# Patient Record
Sex: FEMALE | Race: WHITE | Employment: FULL TIME | URBAN - METROPOLITAN AREA
[De-identification: names, ages, dates, MRNs, and addresses within clinical notes are randomized per-mention and may not be internally consistent; named-entity substitution may affect disease eponyms.]

---

## 2018-10-24 ENCOUNTER — HOSPITAL ENCOUNTER (OUTPATIENT)
Dept: GENERAL RADIOLOGY | Age: 38
Discharge: HOME | End: 2018-10-24
Payer: COMMERCIAL

## 2018-10-24 DIAGNOSIS — M51.37 DEGENERATION OF LUMBAR OR LUMBOSACRAL INTERVERTEBRAL DISC: ICD-10-CM

## 2018-10-24 DIAGNOSIS — M47.817 LUMBOSACRAL SPONDYLOSIS WITHOUT MYELOPATHY: ICD-10-CM

## 2018-10-24 DIAGNOSIS — M51.36 DEGENERATION OF LUMBAR INTERVERTEBRAL DISC: ICD-10-CM

## 2018-10-24 DIAGNOSIS — M47.816 LUMBAR SPONDYLOSIS: ICD-10-CM

## 2018-10-24 PROCEDURE — 72110 X-RAY EXAM L-2 SPINE 4/>VWS: CPT

## 2018-11-15 ENCOUNTER — HOSPITAL ENCOUNTER (OUTPATIENT)
Dept: MRI IMAGING | Age: 38
Discharge: HOME | End: 2018-11-15
Payer: COMMERCIAL

## 2018-11-15 DIAGNOSIS — M51.36 DEGENERATION OF LUMBAR INTERVERTEBRAL DISC: ICD-10-CM

## 2018-11-15 DIAGNOSIS — M51.37 DEGENERATION OF LUMBAR OR LUMBOSACRAL INTERVERTEBRAL DISC: ICD-10-CM

## 2018-11-15 PROCEDURE — 72148 MRI LUMBAR SPINE W/O DYE: CPT

## 2019-12-04 ENCOUNTER — HOSPITAL ENCOUNTER (OUTPATIENT)
Dept: PREADMISSION TESTING | Age: 39
Discharge: HOME | End: 2019-12-04
Payer: COMMERCIAL

## 2019-12-04 VITALS
HEIGHT: 67 IN | HEART RATE: 69 BPM | RESPIRATION RATE: 14 BRPM | BODY MASS INDEX: 26.68 KG/M2 | DIASTOLIC BLOOD PRESSURE: 77 MMHG | WEIGHT: 170 LBS | TEMPERATURE: 98.3 F | SYSTOLIC BLOOD PRESSURE: 136 MMHG

## 2019-12-04 LAB
BASOPHILS # BLD: 0.1 K/UL (ref 0–0.1)
BASOPHILS NFR BLD: 1 % (ref 0–1)
DIFFERENTIAL METHOD BLD: NORMAL
EOSINOPHIL # BLD: 0.1 K/UL (ref 0–0.4)
EOSINOPHIL NFR BLD: 1 % (ref 0–7)
ERYTHROCYTE [DISTWIDTH] IN BLOOD BY AUTOMATED COUNT: 12.8 % (ref 11.5–14.5)
HCG UR QL: NEGATIVE
HCT VFR BLD AUTO: 43.4 % (ref 35–47)
HGB BLD-MCNC: 13.9 G/DL (ref 11.5–16)
IMM GRANULOCYTES # BLD AUTO: 0 K/UL (ref 0–0.04)
IMM GRANULOCYTES NFR BLD AUTO: 0 % (ref 0–0.5)
LYMPHOCYTES # BLD: 2.5 K/UL (ref 0.8–3.5)
LYMPHOCYTES NFR BLD: 26 % (ref 12–49)
MCH RBC QN AUTO: 31.1 PG (ref 26–34)
MCHC RBC AUTO-ENTMCNC: 32 G/DL (ref 30–36.5)
MCV RBC AUTO: 97.1 FL (ref 80–99)
MONOCYTES # BLD: 0.8 K/UL (ref 0–1)
MONOCYTES NFR BLD: 8 % (ref 5–13)
NEUTS SEG # BLD: 6.2 K/UL (ref 1.8–8)
NEUTS SEG NFR BLD: 64 % (ref 32–75)
NRBC # BLD: 0 K/UL (ref 0–0.01)
NRBC BLD-RTO: 0 PER 100 WBC
PLATELET # BLD AUTO: 284 K/UL (ref 150–400)
PMV BLD AUTO: 10.4 FL (ref 8.9–12.9)
RBC # BLD AUTO: 4.47 M/UL (ref 3.8–5.2)
WBC # BLD AUTO: 9.7 K/UL (ref 3.6–11)

## 2019-12-04 PROCEDURE — 81025 URINE PREGNANCY TEST: CPT

## 2019-12-04 PROCEDURE — 85025 COMPLETE CBC W/AUTO DIFF WBC: CPT

## 2019-12-04 PROCEDURE — 36415 COLL VENOUS BLD VENIPUNCTURE: CPT

## 2019-12-04 RX ORDER — IBUPROFEN 200 MG
400 TABLET ORAL
COMMUNITY
End: 2019-12-12

## 2019-12-04 RX ORDER — ALPRAZOLAM 0.25 MG/1
0.25 TABLET ORAL AS NEEDED
COMMUNITY

## 2019-12-04 RX ORDER — MAGNESIUM 200 MG
1250 TABLET ORAL DAILY
COMMUNITY

## 2019-12-04 RX ORDER — BISMUTH SUBSALICYLATE 262 MG
0.5 TABLET,CHEWABLE ORAL DAILY
COMMUNITY

## 2019-12-04 RX ORDER — DEXTROMETHORPHAN HYDROBROMIDE, GUAIFENESIN 5; 100 MG/5ML; MG/5ML
650 LIQUID ORAL EVERY 8 HOURS
COMMUNITY
End: 2019-12-12

## 2019-12-04 RX ORDER — CHOLECALCIFEROL (VITAMIN D3) 125 MCG
1 CAPSULE ORAL DAILY
COMMUNITY

## 2019-12-10 ENCOUNTER — ANESTHESIA EVENT (OUTPATIENT)
Dept: SURGERY | Age: 39
End: 2019-12-10
Payer: COMMERCIAL

## 2019-12-10 NOTE — H&P
Chief Complaint  Annual Well Woman 36 - 45 (Established Patient)    annual, pre-op  Patient's Care Team  Primary Care Provider: ZARA FOX PA: [address], [place], [address] [place] [address], Ph [phone], Fax [phone] NPI: [id number]  OB/GYN: Karlene Baker MD: [place], [place], [address] [place] [address], Ph [phone], Fax [phone] NPI: [id number]  Patient's Pharmacies  Krystin Salvador [phone] [facility]): [address] [place] [address], [address] [place] [address], Ph [phone], Fax [phone]  Ht: 5 ft 7 in (170.18 cm) 2019 02:49 pm Wt: 173 lbs (78.47 kg) 2019 02:52 pm BMI: 27.1 2019 02:52 pm   BP: 112/68 sitting R arm 2019 02:53 pm       Allergies  Reviewed Allergies     SHELLFISH DERIVED: Hives    Medications  Reviewed Medications     magnesium 19   entered Merla [place]   Mirena 20 mcg/24 hours (5 yrs) 52 mg intrauterine device  Take by intrauterine route. , start 2018   filled Merla Carol   Vitamin D3 19   entered Merla Carol   Problems  Reviewed Problems     Reduced libido - Onset: 2008 - Entered By: James Goyal MDSigned By: Nadya Silvestre MD Description: DECREASED LIBIDO code: 507.54    19 8am [facility] main/Hysterectomy, Total - Laparoscopic w/Bilateral Salpingectomy/clipp with kimberly to assist  Family History  Discussed Family History    Maternal Grandfather - Heart disease   Father - Heart disease   Mother - Hypertensive disorder   Maternal Uncle - Diverticulitis   Social History  Discussed Social History  OB/GYN Social History  Chewing tobacco: none  Tobacco Smoking Status: Former smoker  Smokeless Tobacco Status: Never used smokeless tobacco  Tobacco-years of use: 10 (Notes: quit )  E-cigarette/Vape Status: Never used electronic cigarettes  Most Recent Tobacco Use Screenin2019  Marital status:  (Notes: moved from [facility])  Are you working: Yes  Occupation: office  On average, how many days per week do you engage in moderate to strenuous EXERCISE (like walking fast, running, jogging, dancing, swimming, biking, or other activities that cause a light or heavy sweat)?: 6  How often do you have a DRINK containing ALCOHOL?: 2-4 times a month  How many standard DRINKS containing alcohol do you have on a typical day?: 1 or 2  Surgical History  Reviewed Surgical History      delivery   tonsilectomy/adenoids  GYN History  Reviewed GYN History  Date of LMP: 2018 (Notes: irregular spotting with IUD). Frequency of Cycle (Q days): 28. Duration of Flow (days): 7. Flow: Heavy. Menses Monthly: Y. Menstrual Cramps: moderate. Date of Last Pap Smear: 10/19/2016 (Notes: RNIL). Date of HPV testing: 10/19/2016. HPV testing results: Negative. Any Treatment for Abnormal Pap?: N. Age at Menarche: 5. HPV Vaccine: N. Sexually Active?: N.  STIs/STDs: Y. Current Birth Control Method: Partner Vasectomy. Date of Last Mammogram: 10/27/2016 (Notes: BIRADS 1). Obstetric History  Reviewed Obstetric History    TOTAL FULL PRE AB. I AB. S ECTOPICS MULTIPLE LIVING   2 2      2   Past Medical History  Discussed Past Medical History  Acne: Y - accutaine  Asthma: Y  HPI  Rm 1: Patient presents today for annual exam and pre op exam.  TLH/bilateral salpingectomy scheduled 19. Irregular bleeding continues. Bleeding is still nearly constant. Mirena placed  with improvement in heaviness but now is persistent and worsens after intercourse. Interested in definitive surgical management. Trial of OCPs in addition was not much help. U/S : 5cm right lateral fibroid. ROS  Additionally reports: Except as noted in the HPI, the review of systems is negative for General, Breast, , Resp, GI, CV, Endo, MS, Psych and Heme. Physical Exam  Patient is a 43-year-old female. Constitutional: General Appearance: well developed and nourished and pleasant. Level of Distress: no acute distress.     Head: Head: normocephalic. Eyes: Extraocular Movements extraocular movements intact. Ears, Nose, Mouth, Throat: Ears normal hearing. Nose: no external nose lesions. Neck: Appearance supple, trachea midline, and no neck masses. Thyroid: no enlargement or nodules and non-tender. Lungs / Chest: Respiratory effort: unlabored. Inspection / Palpation: no deformity, tenderness, or swelling. Cardiovascular: Rate And Rhythm: regular. Extremities: no clubbing, cyanosis, or edema. Breast: Breasts no masses, tenderness, skin changes, abnormal secretions, or axillary lymphadenopathy and symmetric and normal nipple appearance. Abdomen: Inspection and Palpation: no tenderness, guarding, masses, rebound tenderness, or CVA tenderness and soft and non-distended. Liver: non-tender; no masses. Spleen: no masses. Hernia: none palpable. Female : External genitalia: no lesions, rash, or erythema. Vagina: no discharge, mass, or tenderness. Cervix: no discharge or cervical lesion. Uterus: midline, non-tender, no mass, and not enlarged. Adnexae: no adnexal mass or tenderness. Bladder and Urethra: no urethral discharge or mass. Lymphatics: Inguinal no inguinal lymphadenopathy. Skin: General Skin no rash or suspicious lesions. Neurologic: Gait and Station: normal gait. Sensation: grossly intact. Motor: grossly intact. Mental Status Exam: Orientation oriented to person, place, and time. Procedure Documentation  Endometrial Biopsy:    Risks, benefits and indications for endometrial biopsy procedure fully reviewed. Patient questions answered. Informed consent obtained. A time-out was performed to confirm patient identity and procedure. Time:    The patient was placed in dorsal lithotomy position. The speculum placed into vagina. Cervix was prepped with sterile saline. Tenaculum applied to anterior lip of cervix. Endometrial pipelle introduced. Uterus sounded to 9cm. Suction initiated. A total of 1 passes performed. Adequate endometrial tissue obtained. Tenaculum site hemostaticafter application of silver nitrate stick. Patient tolerated procedure well. Assessment / Plan  1. Gynecologic examination -  Discussed current pap guidelines and recommendations - cotesting today. Discussed healthy lifestyle. Discussed need for breast awareness and yearly breast exams by provider and yearly mammograms - will schedule  Z01.419: Encounter for gynecological examination (general) (routine) without abnormal findings   IGP, APTIMA HPV, RFX [id number] -       Specimen source: Endocervical  Gynecological source: Cervical: Y Gynecological source: Endocervical: Y   Collection technique: Brush-Spatula: Y      2. Menorrhagia -  Decision made at the time of the visit to perform EMB, Pt tolerated procedure well and performed without difficulty, reviewed common and concerning post-procedure side effects. scheduled for TLH, BS on 12/11/19  Consent obtained and all questions answered  N92.0: Excessive and frequent menstruation with regular cycle   PATHOLOGY [id number] -       Specimen source: Endometrium    3.  Uterine leiomyoma -  see above  D25.9: Leiomyoma of uterus, unspecified

## 2019-12-11 ENCOUNTER — HOSPITAL ENCOUNTER (OUTPATIENT)
Age: 39
Setting detail: OBSERVATION
Discharge: HOME | End: 2019-12-12
Attending: OBSTETRICS & GYNECOLOGY | Admitting: OBSTETRICS & GYNECOLOGY
Payer: COMMERCIAL

## 2019-12-11 ENCOUNTER — ANESTHESIA (OUTPATIENT)
Dept: SURGERY | Age: 39
End: 2019-12-11
Payer: COMMERCIAL

## 2019-12-11 DIAGNOSIS — G89.18 POST-OP PAIN: Primary | ICD-10-CM

## 2019-12-11 PROBLEM — D21.9 FIBROIDS: Status: ACTIVE | Noted: 2019-12-11

## 2019-12-11 LAB — HCG UR QL: NEGATIVE

## 2019-12-11 PROCEDURE — 74011000250 HC RX REV CODE- 250: Performed by: OBSTETRICS & GYNECOLOGY

## 2019-12-11 PROCEDURE — 77030031139 HC SUT VCRL2 J&J -A: Performed by: OBSTETRICS & GYNECOLOGY

## 2019-12-11 PROCEDURE — 74011250636 HC RX REV CODE- 250/636: Performed by: OBSTETRICS & GYNECOLOGY

## 2019-12-11 PROCEDURE — 74011250636 HC RX REV CODE- 250/636: Performed by: ANESTHESIOLOGY

## 2019-12-11 PROCEDURE — 77030012770 HC TRCR OPT FX AMR -B: Performed by: OBSTETRICS & GYNECOLOGY

## 2019-12-11 PROCEDURE — 77030021668 HC NEB PREFIL KT VYRM -A: Performed by: OBSTETRICS & GYNECOLOGY

## 2019-12-11 PROCEDURE — 76210000017 HC OR PH I REC 1.5 TO 2 HR: Performed by: OBSTETRICS & GYNECOLOGY

## 2019-12-11 PROCEDURE — 77030013079 HC BLNKT BAIR HGGR 3M -A: Performed by: ANESTHESIOLOGY

## 2019-12-11 PROCEDURE — 77030002933 HC SUT MCRYL J&J -A: Performed by: OBSTETRICS & GYNECOLOGY

## 2019-12-11 PROCEDURE — 77030019908 HC STETH ESOPH SIMS -A: Performed by: ANESTHESIOLOGY

## 2019-12-11 PROCEDURE — 74011250637 HC RX REV CODE- 250/637: Performed by: OBSTETRICS & GYNECOLOGY

## 2019-12-11 PROCEDURE — 77030020829: Performed by: OBSTETRICS & GYNECOLOGY

## 2019-12-11 PROCEDURE — 76010000131 HC OR TIME 2 TO 2.5 HR: Performed by: OBSTETRICS & GYNECOLOGY

## 2019-12-11 PROCEDURE — 77030008684 HC TU ET CUF COVD -B: Performed by: ANESTHESIOLOGY

## 2019-12-11 PROCEDURE — 77030040361 HC SLV COMPR DVT MDII -B: Performed by: OBSTETRICS & GYNECOLOGY

## 2019-12-11 PROCEDURE — 99218 HC RM OBSERVATION: CPT

## 2019-12-11 PROCEDURE — 81025 URINE PREGNANCY TEST: CPT

## 2019-12-11 PROCEDURE — 74011000250 HC RX REV CODE- 250: Performed by: NURSE ANESTHETIST, CERTIFIED REGISTERED

## 2019-12-11 PROCEDURE — 77030011640 HC PAD GRND REM COVD -A: Performed by: OBSTETRICS & GYNECOLOGY

## 2019-12-11 PROCEDURE — 74011250636 HC RX REV CODE- 250/636: Performed by: NURSE ANESTHETIST, CERTIFIED REGISTERED

## 2019-12-11 PROCEDURE — 88307 TISSUE EXAM BY PATHOLOGIST: CPT

## 2019-12-11 PROCEDURE — 77030008756 HC TU IRR SUC STRY -B: Performed by: OBSTETRICS & GYNECOLOGY

## 2019-12-11 PROCEDURE — 77030018836 HC SOL IRR NACL ICUM -A: Performed by: OBSTETRICS & GYNECOLOGY

## 2019-12-11 PROCEDURE — 77030040922 HC BLNKT HYPOTHRM STRY -A

## 2019-12-11 PROCEDURE — 74011250637 HC RX REV CODE- 250/637: Performed by: NURSE ANESTHETIST, CERTIFIED REGISTERED

## 2019-12-11 PROCEDURE — 77030016151 HC PROTCTR LNS DFOG COVD -B: Performed by: OBSTETRICS & GYNECOLOGY

## 2019-12-11 PROCEDURE — 77030019927 HC TBNG IRR CYSTO BAXT -A: Performed by: OBSTETRICS & GYNECOLOGY

## 2019-12-11 PROCEDURE — 77030026438 HC STYL ET INTUB CARD -A: Performed by: ANESTHESIOLOGY

## 2019-12-11 PROCEDURE — 76060000035 HC ANESTHESIA 2 TO 2.5 HR: Performed by: OBSTETRICS & GYNECOLOGY

## 2019-12-11 RX ORDER — MIDAZOLAM HYDROCHLORIDE 1 MG/ML
1 INJECTION, SOLUTION INTRAMUSCULAR; INTRAVENOUS AS NEEDED
Status: DISCONTINUED | OUTPATIENT
Start: 2019-12-11 | End: 2019-12-11 | Stop reason: HOSPADM

## 2019-12-11 RX ORDER — GLYCOPYRROLATE 0.2 MG/ML
INJECTION INTRAMUSCULAR; INTRAVENOUS AS NEEDED
Status: DISCONTINUED | OUTPATIENT
Start: 2019-12-11 | End: 2019-12-11 | Stop reason: HOSPADM

## 2019-12-11 RX ORDER — FENTANYL CITRATE 50 UG/ML
50 INJECTION, SOLUTION INTRAMUSCULAR; INTRAVENOUS AS NEEDED
Status: DISCONTINUED | OUTPATIENT
Start: 2019-12-11 | End: 2019-12-11 | Stop reason: HOSPADM

## 2019-12-11 RX ORDER — SODIUM CHLORIDE 0.9 % (FLUSH) 0.9 %
5-40 SYRINGE (ML) INJECTION AS NEEDED
Status: DISCONTINUED | OUTPATIENT
Start: 2019-12-11 | End: 2019-12-11 | Stop reason: HOSPADM

## 2019-12-11 RX ORDER — SCOLOPAMINE TRANSDERMAL SYSTEM 1 MG/1
PATCH, EXTENDED RELEASE TRANSDERMAL AS NEEDED
Status: DISCONTINUED | OUTPATIENT
Start: 2019-12-11 | End: 2019-12-11 | Stop reason: HOSPADM

## 2019-12-11 RX ORDER — DIPHENHYDRAMINE HYDROCHLORIDE 50 MG/ML
12.5 INJECTION, SOLUTION INTRAMUSCULAR; INTRAVENOUS AS NEEDED
Status: DISCONTINUED | OUTPATIENT
Start: 2019-12-11 | End: 2019-12-11 | Stop reason: HOSPADM

## 2019-12-11 RX ORDER — IBUPROFEN 600 MG/1
600 TABLET ORAL
Qty: 30 TAB | Refills: 1 | Status: SHIPPED | OUTPATIENT
Start: 2019-12-11

## 2019-12-11 RX ORDER — ONDANSETRON 2 MG/ML
INJECTION INTRAMUSCULAR; INTRAVENOUS AS NEEDED
Status: DISCONTINUED | OUTPATIENT
Start: 2019-12-11 | End: 2019-12-11 | Stop reason: HOSPADM

## 2019-12-11 RX ORDER — HYDROMORPHONE HYDROCHLORIDE 2 MG/ML
INJECTION, SOLUTION INTRAMUSCULAR; INTRAVENOUS; SUBCUTANEOUS AS NEEDED
Status: DISCONTINUED | OUTPATIENT
Start: 2019-12-11 | End: 2019-12-11 | Stop reason: HOSPADM

## 2019-12-11 RX ORDER — SODIUM CHLORIDE 9 MG/ML
50 INJECTION, SOLUTION INTRAVENOUS CONTINUOUS
Status: DISCONTINUED | OUTPATIENT
Start: 2019-12-11 | End: 2019-12-11 | Stop reason: HOSPADM

## 2019-12-11 RX ORDER — FENTANYL CITRATE 50 UG/ML
25 INJECTION, SOLUTION INTRAMUSCULAR; INTRAVENOUS
Status: DISCONTINUED | OUTPATIENT
Start: 2019-12-11 | End: 2019-12-11 | Stop reason: HOSPADM

## 2019-12-11 RX ORDER — SODIUM CHLORIDE 0.9 % (FLUSH) 0.9 %
5-40 SYRINGE (ML) INJECTION EVERY 8 HOURS
Status: DISCONTINUED | OUTPATIENT
Start: 2019-12-11 | End: 2019-12-11 | Stop reason: HOSPADM

## 2019-12-11 RX ORDER — DEXMEDETOMIDINE HYDROCHLORIDE 100 UG/ML
INJECTION, SOLUTION INTRAVENOUS AS NEEDED
Status: DISCONTINUED | OUTPATIENT
Start: 2019-12-11 | End: 2019-12-11 | Stop reason: HOSPADM

## 2019-12-11 RX ORDER — IBUPROFEN 600 MG/1
600 TABLET ORAL
Status: DISCONTINUED | OUTPATIENT
Start: 2019-12-11 | End: 2019-12-12 | Stop reason: HOSPADM

## 2019-12-11 RX ORDER — PROPOFOL 10 MG/ML
INJECTION, EMULSION INTRAVENOUS AS NEEDED
Status: DISCONTINUED | OUTPATIENT
Start: 2019-12-11 | End: 2019-12-11 | Stop reason: HOSPADM

## 2019-12-11 RX ORDER — OXYCODONE AND ACETAMINOPHEN 5; 325 MG/1; MG/1
1 TABLET ORAL
Qty: 20 TAB | Refills: 0 | Status: SHIPPED | OUTPATIENT
Start: 2019-12-11 | End: 2019-12-15

## 2019-12-11 RX ORDER — NALOXONE HYDROCHLORIDE 0.4 MG/ML
0.4 INJECTION, SOLUTION INTRAMUSCULAR; INTRAVENOUS; SUBCUTANEOUS AS NEEDED
Status: DISCONTINUED | OUTPATIENT
Start: 2019-12-11 | End: 2019-12-12 | Stop reason: HOSPADM

## 2019-12-11 RX ORDER — SODIUM CHLORIDE 0.9 % (FLUSH) 0.9 %
5-40 SYRINGE (ML) INJECTION AS NEEDED
Status: DISCONTINUED | OUTPATIENT
Start: 2019-12-11 | End: 2019-12-12 | Stop reason: HOSPADM

## 2019-12-11 RX ORDER — LIDOCAINE HYDROCHLORIDE 10 MG/ML
0.1 INJECTION, SOLUTION EPIDURAL; INFILTRATION; INTRACAUDAL; PERINEURAL AS NEEDED
Status: DISCONTINUED | OUTPATIENT
Start: 2019-12-11 | End: 2019-12-11 | Stop reason: HOSPADM

## 2019-12-11 RX ORDER — BUPIVACAINE HYDROCHLORIDE 2.5 MG/ML
INJECTION, SOLUTION EPIDURAL; INFILTRATION; INTRACAUDAL AS NEEDED
Status: DISCONTINUED | OUTPATIENT
Start: 2019-12-11 | End: 2019-12-11 | Stop reason: HOSPADM

## 2019-12-11 RX ORDER — ROCURONIUM BROMIDE 10 MG/ML
INJECTION, SOLUTION INTRAVENOUS AS NEEDED
Status: DISCONTINUED | OUTPATIENT
Start: 2019-12-11 | End: 2019-12-11 | Stop reason: HOSPADM

## 2019-12-11 RX ORDER — MIDAZOLAM HYDROCHLORIDE 1 MG/ML
0.5 INJECTION, SOLUTION INTRAMUSCULAR; INTRAVENOUS
Status: DISCONTINUED | OUTPATIENT
Start: 2019-12-11 | End: 2019-12-11 | Stop reason: HOSPADM

## 2019-12-11 RX ORDER — SODIUM CHLORIDE, SODIUM LACTATE, POTASSIUM CHLORIDE, CALCIUM CHLORIDE 600; 310; 30; 20 MG/100ML; MG/100ML; MG/100ML; MG/100ML
100 INJECTION, SOLUTION INTRAVENOUS CONTINUOUS
Status: DISCONTINUED | OUTPATIENT
Start: 2019-12-11 | End: 2019-12-11 | Stop reason: HOSPADM

## 2019-12-11 RX ORDER — MORPHINE SULFATE 10 MG/ML
2 INJECTION, SOLUTION INTRAMUSCULAR; INTRAVENOUS
Status: DISCONTINUED | OUTPATIENT
Start: 2019-12-11 | End: 2019-12-11 | Stop reason: HOSPADM

## 2019-12-11 RX ORDER — SODIUM CHLORIDE 0.9 % (FLUSH) 0.9 %
5-40 SYRINGE (ML) INJECTION EVERY 8 HOURS
Status: DISCONTINUED | OUTPATIENT
Start: 2019-12-11 | End: 2019-12-12 | Stop reason: HOSPADM

## 2019-12-11 RX ORDER — ONDANSETRON 2 MG/ML
4 INJECTION INTRAMUSCULAR; INTRAVENOUS AS NEEDED
Status: DISCONTINUED | OUTPATIENT
Start: 2019-12-11 | End: 2019-12-11 | Stop reason: HOSPADM

## 2019-12-11 RX ORDER — SODIUM CHLORIDE 9 MG/ML
25 INJECTION, SOLUTION INTRAVENOUS CONTINUOUS
Status: DISCONTINUED | OUTPATIENT
Start: 2019-12-11 | End: 2019-12-11 | Stop reason: HOSPADM

## 2019-12-11 RX ORDER — OXYCODONE AND ACETAMINOPHEN 5; 325 MG/1; MG/1
1 TABLET ORAL
Status: DISCONTINUED | OUTPATIENT
Start: 2019-12-11 | End: 2019-12-12 | Stop reason: HOSPADM

## 2019-12-11 RX ORDER — CEFAZOLIN SODIUM/WATER 2 G/20 ML
2 SYRINGE (ML) INTRAVENOUS
Status: COMPLETED | OUTPATIENT
Start: 2019-12-11 | End: 2019-12-11

## 2019-12-11 RX ORDER — DIPHENHYDRAMINE HCL 25 MG
25 CAPSULE ORAL
Status: DISCONTINUED | OUTPATIENT
Start: 2019-12-11 | End: 2019-12-12 | Stop reason: HOSPADM

## 2019-12-11 RX ORDER — SODIUM CHLORIDE 9 MG/ML
125 INJECTION, SOLUTION INTRAVENOUS CONTINUOUS
Status: DISPENSED | OUTPATIENT
Start: 2019-12-11 | End: 2019-12-12

## 2019-12-11 RX ORDER — ROPIVACAINE HYDROCHLORIDE 5 MG/ML
150 INJECTION, SOLUTION EPIDURAL; INFILTRATION; PERINEURAL AS NEEDED
Status: DISCONTINUED | OUTPATIENT
Start: 2019-12-11 | End: 2019-12-11 | Stop reason: HOSPADM

## 2019-12-11 RX ORDER — SODIUM CHLORIDE, SODIUM LACTATE, POTASSIUM CHLORIDE, CALCIUM CHLORIDE 600; 310; 30; 20 MG/100ML; MG/100ML; MG/100ML; MG/100ML
125 INJECTION, SOLUTION INTRAVENOUS CONTINUOUS
Status: DISCONTINUED | OUTPATIENT
Start: 2019-12-11 | End: 2019-12-11 | Stop reason: HOSPADM

## 2019-12-11 RX ORDER — FENTANYL CITRATE 50 UG/ML
INJECTION, SOLUTION INTRAMUSCULAR; INTRAVENOUS AS NEEDED
Status: DISCONTINUED | OUTPATIENT
Start: 2019-12-11 | End: 2019-12-11 | Stop reason: HOSPADM

## 2019-12-11 RX ORDER — KETOROLAC TROMETHAMINE 30 MG/ML
30 INJECTION, SOLUTION INTRAMUSCULAR; INTRAVENOUS
Status: ACTIVE | OUTPATIENT
Start: 2019-12-11 | End: 2019-12-12

## 2019-12-11 RX ORDER — DOCUSATE SODIUM 100 MG/1
100 CAPSULE, LIQUID FILLED ORAL 2 TIMES DAILY
Status: DISCONTINUED | OUTPATIENT
Start: 2019-12-11 | End: 2019-12-12 | Stop reason: HOSPADM

## 2019-12-11 RX ORDER — HYDROCODONE BITARTRATE AND ACETAMINOPHEN 5; 325 MG/1; MG/1
1 TABLET ORAL AS NEEDED
Status: DISCONTINUED | OUTPATIENT
Start: 2019-12-11 | End: 2019-12-11 | Stop reason: HOSPADM

## 2019-12-11 RX ORDER — HYDROMORPHONE HYDROCHLORIDE 1 MG/ML
0.2 INJECTION, SOLUTION INTRAMUSCULAR; INTRAVENOUS; SUBCUTANEOUS
Status: DISCONTINUED | OUTPATIENT
Start: 2019-12-11 | End: 2019-12-11 | Stop reason: HOSPADM

## 2019-12-11 RX ORDER — LIDOCAINE HYDROCHLORIDE 20 MG/ML
INJECTION, SOLUTION EPIDURAL; INFILTRATION; INTRACAUDAL; PERINEURAL AS NEEDED
Status: DISCONTINUED | OUTPATIENT
Start: 2019-12-11 | End: 2019-12-11

## 2019-12-11 RX ORDER — NEOSTIGMINE METHYLSULFATE 1 MG/ML
INJECTION INTRAVENOUS AS NEEDED
Status: DISCONTINUED | OUTPATIENT
Start: 2019-12-11 | End: 2019-12-11 | Stop reason: HOSPADM

## 2019-12-11 RX ORDER — SODIUM CHLORIDE, SODIUM LACTATE, POTASSIUM CHLORIDE, CALCIUM CHLORIDE 600; 310; 30; 20 MG/100ML; MG/100ML; MG/100ML; MG/100ML
75 INJECTION, SOLUTION INTRAVENOUS CONTINUOUS
Status: DISCONTINUED | OUTPATIENT
Start: 2019-12-11 | End: 2019-12-11 | Stop reason: HOSPADM

## 2019-12-11 RX ORDER — ONDANSETRON 2 MG/ML
4 INJECTION INTRAMUSCULAR; INTRAVENOUS
Status: DISCONTINUED | OUTPATIENT
Start: 2019-12-11 | End: 2019-12-12 | Stop reason: HOSPADM

## 2019-12-11 RX ORDER — ACETAMINOPHEN 325 MG/1
650 TABLET ORAL
Status: DISCONTINUED | OUTPATIENT
Start: 2019-12-11 | End: 2019-12-12 | Stop reason: HOSPADM

## 2019-12-11 RX ORDER — DEXAMETHASONE SODIUM PHOSPHATE 4 MG/ML
INJECTION, SOLUTION INTRA-ARTICULAR; INTRALESIONAL; INTRAMUSCULAR; INTRAVENOUS; SOFT TISSUE AS NEEDED
Status: DISCONTINUED | OUTPATIENT
Start: 2019-12-11 | End: 2019-12-11 | Stop reason: HOSPADM

## 2019-12-11 RX ORDER — HYDROMORPHONE HYDROCHLORIDE 1 MG/ML
1 INJECTION, SOLUTION INTRAMUSCULAR; INTRAVENOUS; SUBCUTANEOUS
Status: DISCONTINUED | OUTPATIENT
Start: 2019-12-11 | End: 2019-12-12 | Stop reason: HOSPADM

## 2019-12-11 RX ORDER — ALPRAZOLAM 0.25 MG/1
0.25 TABLET ORAL AS NEEDED
Status: DISCONTINUED | OUTPATIENT
Start: 2019-12-11 | End: 2019-12-12 | Stop reason: HOSPADM

## 2019-12-11 RX ORDER — MIDAZOLAM HYDROCHLORIDE 1 MG/ML
INJECTION, SOLUTION INTRAMUSCULAR; INTRAVENOUS AS NEEDED
Status: DISCONTINUED | OUTPATIENT
Start: 2019-12-11 | End: 2019-12-11 | Stop reason: HOSPADM

## 2019-12-11 RX ADMIN — SODIUM CHLORIDE, POTASSIUM CHLORIDE, SODIUM LACTATE AND CALCIUM CHLORIDE: 600; 310; 30; 20 INJECTION, SOLUTION INTRAVENOUS at 07:30

## 2019-12-11 RX ADMIN — OXYCODONE AND ACETAMINOPHEN 1 TABLET: 5; 325 TABLET ORAL at 17:18

## 2019-12-11 RX ADMIN — HYDROMORPHONE HYDROCHLORIDE 0.5 MG: 2 INJECTION, SOLUTION INTRAMUSCULAR; INTRAVENOUS; SUBCUTANEOUS at 08:33

## 2019-12-11 RX ADMIN — HYDROMORPHONE HYDROCHLORIDE 1 MG: 2 INJECTION, SOLUTION INTRAMUSCULAR; INTRAVENOUS; SUBCUTANEOUS at 09:59

## 2019-12-11 RX ADMIN — OXYCODONE AND ACETAMINOPHEN 1 TABLET: 5; 325 TABLET ORAL at 21:25

## 2019-12-11 RX ADMIN — SODIUM CHLORIDE 125 ML/HR: 900 INJECTION, SOLUTION INTRAVENOUS at 10:37

## 2019-12-11 RX ADMIN — ROCURONIUM BROMIDE 20 MG: 10 SOLUTION INTRAVENOUS at 08:43

## 2019-12-11 RX ADMIN — PROPOFOL 200 MG: 10 INJECTION, EMULSION INTRAVENOUS at 08:00

## 2019-12-11 RX ADMIN — Medication 10 ML: at 13:16

## 2019-12-11 RX ADMIN — FENTANYL CITRATE 25 MCG: 50 INJECTION, SOLUTION INTRAMUSCULAR; INTRAVENOUS at 07:53

## 2019-12-11 RX ADMIN — DOCUSATE SODIUM 100 MG: 100 CAPSULE, LIQUID FILLED ORAL at 17:18

## 2019-12-11 RX ADMIN — ROCURONIUM BROMIDE 10 MG: 10 SOLUTION INTRAVENOUS at 09:26

## 2019-12-11 RX ADMIN — ROCURONIUM BROMIDE 25 MG: 10 SOLUTION INTRAVENOUS at 08:14

## 2019-12-11 RX ADMIN — DOCUSATE SODIUM 100 MG: 100 CAPSULE, LIQUID FILLED ORAL at 13:16

## 2019-12-11 RX ADMIN — ONDANSETRON 4 MG: 2 INJECTION INTRAMUSCULAR; INTRAVENOUS at 13:16

## 2019-12-11 RX ADMIN — GLYCOPYRROLATE 0.4 MG: 0.2 INJECTION, SOLUTION INTRAMUSCULAR; INTRAVENOUS at 09:46

## 2019-12-11 RX ADMIN — ROCURONIUM BROMIDE 25 MG: 10 SOLUTION INTRAVENOUS at 08:00

## 2019-12-11 RX ADMIN — HYDROMORPHONE HYDROCHLORIDE 0.5 MG: 2 INJECTION, SOLUTION INTRAMUSCULAR; INTRAVENOUS; SUBCUTANEOUS at 08:51

## 2019-12-11 RX ADMIN — DEXMEDETOMIDINE HYDROCHLORIDE 8 MCG: 100 INJECTION, SOLUTION, CONCENTRATE INTRAVENOUS at 09:40

## 2019-12-11 RX ADMIN — Medication 2 G: at 08:15

## 2019-12-11 RX ADMIN — Medication 650 MG: at 20:51

## 2019-12-11 RX ADMIN — SCOPALAMINE 1 PATCH: 1 PATCH, EXTENDED RELEASE TRANSDERMAL at 07:53

## 2019-12-11 RX ADMIN — MIDAZOLAM 2 MG: 1 INJECTION INTRAMUSCULAR; INTRAVENOUS at 07:53

## 2019-12-11 RX ADMIN — SODIUM CHLORIDE, SODIUM LACTATE, POTASSIUM CHLORIDE, AND CALCIUM CHLORIDE 125 ML/HR: 600; 310; 30; 20 INJECTION, SOLUTION INTRAVENOUS at 07:47

## 2019-12-11 RX ADMIN — FENTANYL CITRATE 50 MCG: 50 INJECTION, SOLUTION INTRAMUSCULAR; INTRAVENOUS at 08:10

## 2019-12-11 RX ADMIN — ONDANSETRON 4 MG: 2 INJECTION INTRAMUSCULAR; INTRAVENOUS at 17:18

## 2019-12-11 RX ADMIN — NEOSTIGMINE METHYLSULFATE 3 MG: 1 INJECTION, SOLUTION INTRAMUSCULAR; INTRAVENOUS; SUBCUTANEOUS at 09:46

## 2019-12-11 RX ADMIN — FENTANYL CITRATE 25 MCG: 50 INJECTION, SOLUTION INTRAMUSCULAR; INTRAVENOUS at 08:00

## 2019-12-11 RX ADMIN — DEXMEDETOMIDINE HYDROCHLORIDE 8 MCG: 100 INJECTION, SOLUTION, CONCENTRATE INTRAVENOUS at 10:05

## 2019-12-11 RX ADMIN — DEXAMETHASONE SODIUM PHOSPHATE 8 MG: 4 INJECTION, SOLUTION INTRAMUSCULAR; INTRAVENOUS at 08:16

## 2019-12-11 RX ADMIN — Medication 10 ML: at 13:17

## 2019-12-11 RX ADMIN — ONDANSETRON HYDROCHLORIDE 4 MG: 2 INJECTION, SOLUTION INTRAMUSCULAR; INTRAVENOUS at 09:35

## 2019-12-11 NOTE — PERIOP NOTES
TRANSFER - OUT REPORT:    Verbal report given to Delbert Sanders RN (name) on Marcella Spotted  being transferred to (unit) for routine post - op       Report consisted of patients Situation, Background, Assessment and   Recommendations(SBAR). Information from the following report(s) SBAR, Intake/Output, MAR and Accordion was reviewed with the receiving nurse. Lines:   Peripheral IV 12/11/19 Left;Posterior Hand (Active)   Site Assessment Clean, dry, & intact 12/11/2019 10:01 AM   Phlebitis Assessment 0 12/11/2019 10:01 AM   Infiltration Assessment 0 12/11/2019 10:01 AM   Dressing Status Clean, dry, & intact 12/11/2019 10:01 AM   Dressing Type Transparent 12/11/2019 10:01 AM   Hub Color/Line Status Infusing 12/11/2019 10:01 AM   Alcohol Cap Used Yes 12/11/2019 10:01 AM        Opportunity for questions and clarification was provided.       Patient transported with:   O2 @ 1 liters  Tech

## 2019-12-11 NOTE — ANESTHESIA POSTPROCEDURE EVALUATION
Procedure(s):  TOTAL LAPAROSCOPIC HYSTERECTOMY WITH BILATERAL SALPINGECTOMY WITH CYSTOSCOPY. general    <BSHSIANPOST>    Vitals Value Taken Time   /59 12/11/2019 11:15 AM   Temp 36.4 °C (97.5 °F) 12/11/2019 10:45 AM   Pulse 50 12/11/2019 11:16 AM   Resp 15 12/11/2019 11:16 AM   SpO2 94 % 12/11/2019 11:16 AM   Vitals shown include unvalidated device data.

## 2019-12-11 NOTE — PROGRESS NOTES
Gynecology Post Operative Note    Jeff Blount    Assessment: Doing well POD0 s/p TLH, BS and cystoscopy    Plan: Continue routine post-op care  Advance diet as tolerated  Continue oral pain medications  Encouraged ambulation    Post-op day 0 from Procedure(s):  TOTAL LAPAROSCOPIC HYSTERECTOMY WITH BILATERAL SALPINGECTOMY WITH CYSTOSCOPY  She is without significant complaints. Pain controlled on current medication. Voiding without difficulty. Patient is not passing flatus. She is is tolerating her diet. Orders/Charges: Medium    Vitals:  Visit Vitals  /69 (BP 1 Location: Right arm, BP Patient Position: At rest)   Pulse 60   Temp 97.4 °F (36.3 °C)   Resp 16   Ht 5' 7\" (1.702 m)   Wt 77.1 kg (170 lb)   LMP 2019 (Approximate)   SpO2 96%   BMI 26.63 kg/m²     Temp (24hrs), Av.7 °F (36.5 °C), Min:97.4 °F (36.3 °C), Max:98.3 °F (36.8 °C)      Last 24hr Input/Output:    Intake/Output Summary (Last 24 hours) at 2019 1701  Last data filed at 2019 1157  Gross per 24 hour   Intake 2187.5 ml   Output 280 ml   Net 1907.5 ml          Exam:  Patient without distress. Lungs clear              Abdomen soft, bowel sounds present, expected tenderness. Incision dry and clean without erythema. Lower extremities are negative for swelling, cords, or tenderness.     Labs:   Lab Results   Component Value Date/Time    WBC 9.7 2019 03:45 PM    HGB 13.9 2019 03:45 PM    HCT 43.4 2019 03:45 PM    PLATELET 209  03:45 PM       Recent Results (from the past 24 hour(s))   HCG URINE, QL. - POC    Collection Time: 19  6:56 AM   Result Value Ref Range    Pregnancy test,urine (POC) NEGATIVE  NEG

## 2019-12-11 NOTE — PROGRESS NOTES
TRANSFER - IN REPORT:    Verbal report received from [facility]) on Karlie  being received from [employer]) for routine progression of care      Report consisted of patients Situation, Background, Assessment and   Recommendations(SBAR). Information from the following report(s) SBAR, Kardex, OR Summary, Procedure Summary, Intake/Output and MAR was reviewed with the receiving nurse. Opportunity for questions and clarification was provided. Assessment completed upon patients arrival to unit and care assumed.

## 2019-12-11 NOTE — DISCHARGE INSTRUCTIONS
After Care Instructions for Your Laparoscopic Hysterectomy      1. When you are discharged from the hospital, you should plan to eat a bland, light diet for the first 1-2 days. Avoid spicy or greasy foods and high roughage foods such as salads and raw vegetables (these can cause gas and bloating). Drink plenty of non-caffeinated fluids (water is best). You may resume your usual diet gradually. 2. Avoid heavy lifting or straining. Gradually increase your activity. First try walking and doing light activity around the house. Most women can return to work 2-3 weeks after the procedure. You should speak with your doctor about your individual return to work plan and any other restrictions. 3. You may take showers. Please do not take baths until you are seen for your post-operative visit. 4. It is not unusual to experience some discomfort under your ribs and/or soreness of your neck and shoulders over the first 1-2 days. This is due to the gas used in your abdomen during the surgery to help your doctor see your pelvic organs. This gas gets naturally reabsorbed. The right shoulder is often more sore than the left. 5. It is not unusual to have vaginal spotting lasting up to 2 weeks off and on. Some women do not experience any bleeding at all. You should call your doctor immediately if you ever experience heavy vaginal bleeding or gushes of any liquid coming from your vagina that does not seem like the amount you would expect for your normal vaginal discharge. 6. Bruises may appear around the incisions and will gradually resolve as they heal.    7. There are several ways that your incisions may be closed. Most of these do not require the removal of any sutures. Some patients may have skin glue, Dermabond, placed over the incisions. Do not try to peel this off, it will gradually wear off on its own. Other patients will have small paper strips placed over the incisions.   Allow these to fall off on their own or your doctor will remove them. No other special dressing is required. 8. Call the office at [phone] to report any of the following problems: abdominal pain that is increasing in severity despite using the prescribed pain medication, heavy vaginal bleeding, drainage or separation of your incision(s), temperature greater than 100.4 or persistent nausea and vomiting. 9. You should be seen in the office following your surgery. Call for an appointment if this has not already been arranged. At this appointment, the findings noted at the time of your procedure and /or pathology reports will be reviewed.

## 2019-12-11 NOTE — OP NOTES
Operative Note     Name: Yuli Brandon Record Number: [id number]    YOB: 1980    Preoperative Diagnosis: FIBROIDS , MENORRHAGIA    Postoperative Diagnosis: FIBROIDS , MENORRHAGIA    Surgeon:  Leandro Noriega MD     Assistant:  Esthela Sen MD    Anesthesia: General    Procedure: Total Laparoscopic Hysterectomy with bilateral  Salpingectomy for uterus more than 250 grams, vaginal approach to cuff closure, cystoscopy    Findings: uterus with 5cm intramural fibroid left lateral aspect of fundus, normal tubes bilaterally, left ovary with 2cm simple appearing cyst, right ovary normal, normal bladder with bilateral efflux from UOs    Estimated Blood Loss: 100ml    Pathology /Specimens:     ID Type Source Tests Collected by Time Destination   1 : Uterus, Cervic, Bilateral Fallopian Tubes Fresh Uterus with Bilateral Fallopian Tubes  Nay Li MD 12/11/2019 8764 Pathology       DVT Prophylaxis: SCD Hose    Antibiotic Prophylaxis: Ancef 2g pre-op    The patient was taken to the operating room with intravenous fluids running, where she was administered general anesthesia in the supine position. Murray was placed in the bladder using sterile techniques. She was then placed in the dorsal lithotomy position and prepped and draped in usual sterile fashion.  Her mirena IUD was removed. A VCare uterine manipulator is placed and the balloon inflated with 3 cc sterile saline.  Gloves are changed and attention is turned to the abdomen. 0.25% Marcaine with epinephrine is injected at each incision site.  An umbilical incision is made and access is gained using the optiview technique with a 5 mm trocar.  Pneumoperitoneum is obtained and intraabdominal placement is verified.  There was no bleeding and no evidence of injury to the bowel. 5 mm incisions were then made in the left and right lower quadrants and 5 mm ports placed in each of those under direct visualization.  Findings were noted as above. The ureters were identified on either side. On the left hand side, the round ligament was clamped and divided using the Harmonic Ace. The dissection was then taken down through anterior leaflet of the broad ligament to create the bladder flap. The bladder was dissected off the lower uterine segment and cervix. The tuboovarian mesosalpinx was divided and the uteroovarian ligament divided.  The above was then carried out on the right side. Posterior peritoneum was taken down on each side, skeletonizing the uterine arteries. The uterine arteries were clamped, coagulated and cut across the ascending branches using bipolar cautery and the Harmonic ACE. Cardinal ligament was developed. The fornices of the vagina are identified via the VCare cup.  Colpotomy was made with the Harmonic Ace. The remainder of the cardinal and uterosacral ligaments were serially clamped, coagulated, and cut, and colpotomy carried around the VCare cup. When the specimen was free, it was delivered through the vagina. Attention was then turned vaginally and the vaginal cuff was closed with a running locked suture of vicryl with care taken to incorporate the angles of the cuff on each side.  Hemostasis was achieved. The vagina was irrigated.  After new gloves were placed, attention was returned to the abdomen and the pelvis was irrigated with copious amounts of saline and suctioned away. Hemostasis was assured. The left and right lower trocars were removed under direct visualization. Once the pneumoperitoneum was completely reduced and hemostasis is still effective, the final trocar was removed. Skin of all incisions was closed with 4-0 Monocryl in a subcuticular closure. The sponge stick is removed from the vagina.      Next the rueda was removed and cystoscopy performed with findings noted above. All instruments were removed from the bladder. All sponge, lap, needle, and instrument counts were correct times two.     Subsequently, the patient was cleaned up, returned to the supine position, awakened, and taken to the recovery room in stable condition.      Signed By: Geraldine Dickinson MD

## 2019-12-11 NOTE — DISCHARGE SUMMARY
Gynecology Discharge Summary     Patient ID:  Pily Lynn  [id number]  33 y.o.  1980    Admit date: 12/11/2019    Discharge date: 12/11/2019     Admission Diagnoses: There is no problem list on file for this patient. Discharge Diagnoses: No discharge information exists for this patient. There is no problem list on file for this patient. Procedures for this admission: Procedure(s):  TOTAL LAPAROSCOPIC HYSTERECTOMY WITH BILATERAL SALPINGECTOMY WITH CYSTOSCOPY    Hospital Course:    Disposition: Home or self care    Discharged Condition: stable            Patient Instructions:   Current Discharge Medication List      START taking these medications    Details   oxyCODONE-acetaminophen (PERCOCET) 5-325 mg per tablet Take 1 Tab by mouth every four (4) hours as needed for Pain for up to 4 days. Max Daily Amount: 6 Tabs. Qty: 20 Tab, Refills: 0    Associated Diagnoses: Post-op pain         CONTINUE these medications which have CHANGED    Details   ibuprofen (MOTRIN) 600 mg tablet Take 1 Tab by mouth every six (6) hours as needed for Pain. Qty: 30 Tab, Refills: 1         CONTINUE these medications which have NOT CHANGED    Details   cholecalciferol, vitamin D3, (VITAMIN D3) 2,000 unit tab Take 1 Tab by mouth daily. magnesium 200 mg tab Take 1,250 mg by mouth daily. multivitamin (ONE A DAY) tablet Take 0.5 Tabs by mouth daily. ALPRAZolam (XANAX) 0.25 mg tablet Take 0.25 mg by mouth as needed for Anxiety. STOP taking these medications       acetaminophen (TYLENOL ARTHRITIS PAIN) 650 mg TbER Comments:   Reason for Stopping:             Activity: Activity as tolerated and No heavy lifting, sex or Strenuous exercise for 4 weeks. No driving until off of narcotics (percocet). Diet: Regular Diet  Wound Care: Keep wound clean and dry    Follow-up with Dr. Luciana Dunn in 4 weeks.     Signed:  Triston Santiago MD  12/11/2019  10:07 AM

## 2019-12-11 NOTE — BRIEF OP NOTE
BRIEF OPERATIVE NOTE    Date of Procedure: 12/11/2019   Preoperative Diagnosis: FIBROIDS , MENORRHAGIA  Postoperative Diagnosis: FIBROIDS , MENORRHAGIA    Procedure(s):  TOTAL LAPAROSCOPIC HYSTERECTOMY WITH BILATERAL SALPINGECTOMY, Cystoscopy  Surgeon(s) and Role:     * Aline Pena MD - Primary     * Zaida Lopez MD - Assisting           Surgical Staff:  Circ-Intern: Rakan Kerns  Scrub Tech-1: 2011 [facility], [address] Staff: Jun Carlson RN  Event Time In Time Out   Incision Start 8681    Incision Close       Anesthesia: General   Estimated Blood Loss: 100ml  Specimens:   ID Type Source Tests Collected by Time Destination   1 : Uterus, Cervic, Bilateral Fallopian Tubes Fresh Uterus with Bilateral Fallopian Tubes  Aline Malcolm MD 12/11/2019 7667 Pathology      Findings: uterus with 5cm intramural fibroid left lateral aspect of fundus, normal tubes bilaterally, left ovary with 2cm simple appearing cyst, right ovary normal, normal bladder with bilateral efflux from UOs  Complications: none  Implants: * No implants in log *

## 2019-12-11 NOTE — ANESTHESIA PREPROCEDURE EVALUATION
Relevant Problems   No relevant active problems       Anesthetic History               Review of Systems / Medical History      Pulmonary            Asthma        Neuro/Psych              Cardiovascular                       GI/Hepatic/Renal                Endo/Other        Arthritis     Other Findings              Physical Exam    Airway  Mallampati: II  TM Distance: > 6 cm  Neck ROM: normal range of motion   Mouth opening: Normal     Cardiovascular  Regular rate and rhythm,  S1 and S2 normal,  no murmur, click, rub, or gallop             Dental  No notable dental hx       Pulmonary  Breath sounds clear to auscultation               Abdominal  GI exam deferred       Other Findings            Anesthetic Plan    ASA: 2  Anesthesia type: general          Induction: Intravenous  Anesthetic plan and risks discussed with: Patient

## 2019-12-11 NOTE — PROGRESS NOTES
Observation notice provided in writing to patient and/or caregiver as well as verbal explanation of the policy. Patients who are in outpatient status also receive the Observation notice. Reason for Admission:   Fibroids                   RRAT Score:          2/ low           Plan for utilizing home health: At this point [facility]. CM will address if this changes. Current Advanced Directive/Advance Care Plan:      Not on file                    Transition of Care Plan: This CM met with patient to explain CM roles in discharge planning and transition of care. Patient stated that she will return home with her  and supportive family. CM will follow as needed. Care Management Interventions  PCP Verified by CM:  Yes  Palliative Care Criteria Met (RRAT>21 & CHF Dx)?: No  Mode of Transport at Discharge: ()  Transition of Care Consult (CM Consult): (TBd)  MyChart Signup: Yes  Discharge Durable Medical Equipment: No  Physical Therapy Consult: No  Occupational Therapy Consult: No  Speech Therapy Consult: No  Current Support Network: Lives with Caregiver, Lives with Spouse, Own Home  Confirm Follow Up Transport: Self  Plan discussed with Pt/Family/Caregiver: Yes  Discharge Location  Discharge Placement: Home

## 2019-12-11 NOTE — H&P
There has been no interval change from H&P dated 12/10/19. Patient is ready for surgery today. To OR for TLH, BS, vaginal cuff closure, cysto for menorrhagia and leiomyomata.

## 2019-12-12 VITALS
RESPIRATION RATE: 16 BRPM | TEMPERATURE: 98.9 F | DIASTOLIC BLOOD PRESSURE: 79 MMHG | WEIGHT: 170 LBS | BODY MASS INDEX: 26.68 KG/M2 | HEART RATE: 60 BPM | HEIGHT: 67 IN | SYSTOLIC BLOOD PRESSURE: 120 MMHG | OXYGEN SATURATION: 99 %

## 2019-12-12 PROCEDURE — 99218 HC RM OBSERVATION: CPT

## 2019-12-12 PROCEDURE — 74011250637 HC RX REV CODE- 250/637: Performed by: OBSTETRICS & GYNECOLOGY

## 2019-12-12 RX ADMIN — DOCUSATE SODIUM 100 MG: 100 CAPSULE, LIQUID FILLED ORAL at 10:47

## 2019-12-12 NOTE — PROGRESS NOTES
Bedside and Verbal shift change report given to Yunior Gao (oncoming nurse) by Toby Crawford RN (offgoing nurse). Report included the following information SBAR, Kardex, Intake/Output, MAR, Accordion and Recent Results.

## 2022-03-19 PROBLEM — D21.9 FIBROIDS: Status: ACTIVE | Noted: 2019-12-11

## 2023-01-17 ENCOUNTER — OFFICE VISIT (OUTPATIENT)
Facility: CLINIC | Age: 43
End: 2023-01-17
Payer: COMMERCIAL

## 2023-01-17 ENCOUNTER — TELEPHONE (OUTPATIENT)
Dept: CARDIOLOGY CLINIC | Age: 43
End: 2023-01-17

## 2023-01-17 VITALS
HEIGHT: 67 IN | OXYGEN SATURATION: 99 % | DIASTOLIC BLOOD PRESSURE: 60 MMHG | WEIGHT: 177 LBS | BODY MASS INDEX: 27.78 KG/M2 | SYSTOLIC BLOOD PRESSURE: 120 MMHG | RESPIRATION RATE: 16 BRPM | HEART RATE: 55 BPM

## 2023-01-17 DIAGNOSIS — R00.0 TACHYCARDIA: ICD-10-CM

## 2023-01-17 DIAGNOSIS — R00.0 RAPID HEART RATE: Primary | ICD-10-CM

## 2023-01-17 DIAGNOSIS — R00.2 PALPITATIONS: ICD-10-CM

## 2023-01-17 RX ORDER — NABUMETONE 500 MG/1
500 TABLET, FILM COATED ORAL 2 TIMES DAILY
COMMUNITY
Start: 2023-01-12

## 2023-01-17 RX ORDER — DICLOFENAC SODIUM 75 MG/1
TABLET, DELAYED RELEASE ORAL
COMMUNITY
Start: 2023-01-12

## 2023-01-17 RX ORDER — CALCIUM CARB/VITAMIN D3/VIT K1 500-500-40
TABLET,CHEWABLE ORAL
COMMUNITY

## 2023-01-17 RX ORDER — ALBUTEROL SULFATE 90 UG/1
AEROSOL, METERED RESPIRATORY (INHALATION)
COMMUNITY
Start: 2022-10-30

## 2023-01-17 RX ORDER — METHYLPREDNISOLONE 4 MG/1
TABLET ORAL
COMMUNITY
Start: 2023-01-12

## 2023-01-17 NOTE — PATIENT INSTRUCTIONS
A 7 day Holter monitor has been ordered for you. This will be mailed to the address given to us in the next 5-7 business days. Please read over the instructions given to you about [employer] loop monitors. If you have any insurance questions regarding coverage and out of pocket cost please contact the number below. If you have any billing questions regarding deductible or responsibility call [phone]. If you need additional supplies or issue with your monitor please call [phone]. You have been scheduled for an echo. We will send results via [facility].

## 2023-01-17 NOTE — PROGRESS NOTES
Severino Grove     1980       Riya Robertson MD, [facility]  Date of Visit-2023   PCP is [facility]  [facility]  HPI:  Severino Grove is a 36 y.o. female     New patient here for general cardiovascular evaluation. She is concerned about a few symptoms but overall  feels well. She exercises regularly 3-5 times a week for an hour with strength training and cardiovascular training and feels like her exercise tolerance and may be heart rate have changed. Feels her peak exercise heart rate goes higher than it used to at about 200. She is noticed over the past several months some fluttering it happens about once a week last for just a second and feels like she just cannot take a deep breath goes away. She is on Medrol Dosepak for about 4 days after hurting her back. But generally has not been on any medication. Her sister is 48 and has PVCs and atrial septal aneurysm. Patient has no allergies. She had a hysterectomy in    and vaginal l delivery in . No prior cath blood transfusion or ulcers. 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 Family history--  mother 70 in fair health Father passed away from heart attack at 58 and have poor lifestyle and multiple risk factors. She has 4 siblings assist sister as mentioned has PVCs and ASA. Review of systems shortness of breath right heart beating fast heartbeat as mentioned some wheezing in the past and history of asthma has occasional arthritis and stiffness in joints.       Her EKG shows sinus bradycardia with a normal axis and intervals there is no preexcitation the QRS duration is normal at 88 ms    Assessment/Plan:   Patient with tachycardia palpitations that are occasional.  She has some subjective feeling of change in exercise tolerance that is fairly mild. She is able to do a very low good level of exercise otherwise. She has no chest pain or chest pressure and no risk factors other than family history suggest core disease in her exercise tolerance is very good. I would suggest that we get a loop monitor as her echo and Holter monitor for 7 days and I think the likelihood of pathology is low. She continued exercise as she wishes. I will call with results. She says she gets her lipids with Dr. Phi Barker and everything is looking good the lipids were only high because the HDL was high and she says over the TC also good. Physical exam is normal   would suggest she get a coronary calcium score sometime between is a 48-52. Patient Instructions   A 7 day Holter monitor has been ordered for you. This will be mailed to the address given to us in the next 5-7 business days. Please read over the instructions given to you about [employer] loop monitors. If you have any insurance questions regarding coverage and out of pocket cost please contact the number below. If you have any billing questions regarding deductible or responsibility call [phone]. If you need additional supplies or issue with your monitor please call [phone]. You have been scheduled for an echo. We will send results via [employer]. Impression:   1. Rapid heart rate    2. Tachycardia    3. Palpitations       Cardiac History:   No specialty comments available. Future Appointments   Date Time Provider Lyle Flores   2/17/2023  1:00 PM BRIGITTE PIERCE BS AMB      Patient Care Team:  Victor M Heaton as PCP - General (Physician Assistant)  RETA Heaton as PCP - [facility] Empaneled Provider   ROS-except as noted above. . A complete cardiac and respiratory are reviewed and negative except as above ; Resp-denies wheezing  or productive cough,.  Const- No unusual weight loss or fever; Neuro-no recent seizure or CVA ; GI- No BRBPR, abdom pain, bloating ; - no  hematuria   see supplement sheet, initialed and to be scanned by staff    Past Medical History:   Diagnosis Date    Arthritis     BACK    Asthma     Chronic pain     LOW    Nausea & vomiting       Social Hx= reports that she quit smoking about 17 years ago. Her smoking use included cigarettes. She has a 2.50 pack-year smoking history. She has never used smokeless tobacco. She reports current alcohol use. She reports that she does not use drugs. Family Hx- family history includes Anesth Problems in her brother and mother; Diabetes in her mother and sister; Heart Attack in her father; Heart Disease in her father; Hypertension in her mother; No Known Problems in her sister; Other in her sister. Allergies   Allergen Reactions    Adhesive Tape-Silicones Rash    Shellfish Derived Hives        Exam and Labs:  Visit Vitals  /60   Pulse (!) 55   Resp 16   Ht 5' 7\" (1.702 m)   Wt 177 lb (80.3 kg)   SpO2 99%   BMI 27.72 kg/m²    @Constitutional:  NAD, comfortable  Head: NC,AT. Eyes: No scleral icterus. Neck:  Neck supple. No JVD present. Throat: moist mucous membranes. Chest: Effort normal & normal respiratory excursion . Neurological: alert, conversant and oriented . Skin: Skin is not cold. No obvious systemic rash noted. Not diaphoretic. No erythema. Psychiatric:  Grossly normal mood and affect. Behavior appears normal. Extremities:  no clubbing or cyanosis. Abdomen: non distended    Lungs:breath sounds normal. No stridor. distress, wheezes or  Rales. Heart:    normal rate, regular rhythm, normal S1, S2, no murmurs, rubs, clicks or gallops , PMI non displaced. Edema: Edema is none.       No results found for: CHOL, CHOLX, CHLST, CHOLV, HDL, HDLP, LDL, LDLC, DLDLP, TGLX, TRIGL, TRIGP, CHHD, CHHDX  No results found for: NA, K, CL, CO2, AGAP, GLU, BUN, CREA, BUCR, GFRAA, GFRNA, CA, GFRAA   Wt Readings from Last 3 Encounters:   01/17/23 177 lb (80.3 kg)   12/11/19 170 lb (77.1 kg)   12/04/19 170 lb (77.1 kg)      BP Readings from Last 3 Encounters:   01/17/23 120/60   12/12/19 120/79   12/04/19 136/77      Current Outpatient Medications   Medication Sig    cholecalciferol, vitamin d3, 10 mcg (400 unit) cap Vitamin D3    albuterol (PROVENTIL HFA, VENTOLIN HFA, PROAIR HFA) 90 mcg/actuation inhaler INHALE ONE PUFF BY MOUTH EVERY 4 TO 6 HOURS AS NEEDED    diclofenac EC (VOLTAREN) 75 mg EC tablet     methylPREDNISolone (MEDROL DOSEPACK) 4 mg tablet Complete dose pack as prescribed: 6 pills on day 1, 5 on day 2, 4 on day 3, 3 on day 4, 2 on day 5, 1 on day 6    nabumetone (RELAFEN) 500 mg tablet Take 500 mg by mouth two (2) times a day. ibuprofen (MOTRIN) 600 mg tablet Take 1 Tab by mouth every six (6) hours as needed for Pain. cholecalciferol, vitamin D3, 50 mcg (2,000 unit) tab Take 1 Tab by mouth daily. magnesium 200 mg tab Take 1,250 mg by mouth daily. multivitamin (ONE A DAY) tablet Take 0.5 Tabs by mouth daily. ALPRAZolam (XANAX) 0.25 mg tablet Take 0.25 mg by mouth as needed for Anxiety. nabumetone 1,000 mg tab nabumetone     No current facility-administered medications for this visit. Impression see above.

## 2023-01-17 NOTE — LETTER
1/17/2023    Patient: Vennie Fleischer   YOB: 1980   Date of Visit: 1/17/2023     Colleen Epstein, [address]  Via Fax: [phone]    Dear RETA Camargo,      Thank you for referring Ms. Vennie Fleischer to [address] [place] [address] for evaluation. My notes for this consultation are attached. If you have questions, please do not hesitate to call me. I look forward to following your patient along with you.       Sincerely,    Millie Dominguez MD

## 2023-02-17 ENCOUNTER — ANCILLARY PROCEDURE (OUTPATIENT)
Dept: CARDIOLOGY CLINIC | Age: 43
End: 2023-02-17
Payer: COMMERCIAL

## 2023-02-17 VITALS — WEIGHT: 177 LBS | HEIGHT: 67 IN | BODY MASS INDEX: 27.78 KG/M2

## 2023-02-17 DIAGNOSIS — R00.0 RAPID HEART RATE: ICD-10-CM

## 2023-02-17 DIAGNOSIS — R00.2 PALPITATIONS: ICD-10-CM

## 2023-02-17 DIAGNOSIS — R00.0 TACHYCARDIA: ICD-10-CM

## 2023-02-17 LAB
ECHO AO ASC DIAM: 2.7 CM
ECHO AO ASCENDING AORTA INDEX: 1.41 CM/M2
ECHO AO ROOT DIAM: 2.9 CM
ECHO AO ROOT INDEX: 1.51 CM/M2
ECHO AV AREA PEAK VELOCITY: 2.5 CM2
ECHO AV AREA VTI: 2.7 CM2
ECHO AV AREA/BSA PEAK VELOCITY: 1.3 CM2/M2
ECHO AV AREA/BSA VTI: 1.4 CM2/M2
ECHO AV MEAN GRADIENT: 4 MMHG
ECHO AV MEAN VELOCITY: 0.9 M/S
ECHO AV PEAK GRADIENT: 8 MMHG
ECHO AV PEAK VELOCITY: 1.4 M/S
ECHO AV VELOCITY RATIO: 0.71
ECHO AV VTI: 24.3 CM
ECHO EST RA PRESSURE: 3 MMHG
ECHO LA DIAMETER INDEX: 1.72 CM/M2
ECHO LA DIAMETER: 3.3 CM
ECHO LA TO AORTIC ROOT RATIO: 1.14
ECHO LA VOL 2C: 74 ML (ref 22–52)
ECHO LA VOL 4C: 40 ML (ref 22–52)
ECHO LA VOL BP: 56 ML (ref 22–52)
ECHO LA VOL/BSA BIPLANE: 29 ML/M2 (ref 16–34)
ECHO LA VOLUME AREA LENGTH: 62 ML
ECHO LA VOLUME INDEX A2C: 39 ML/M2 (ref 16–34)
ECHO LA VOLUME INDEX A4C: 21 ML/M2 (ref 16–34)
ECHO LA VOLUME INDEX AREA LENGTH: 32 ML/M2 (ref 16–34)
ECHO LV E' LATERAL VELOCITY: 15 CM/S
ECHO LV E' SEPTAL VELOCITY: 12 CM/S
ECHO LV EDV A2C: 96 ML
ECHO LV EDV A4C: 66 ML
ECHO LV EDV BP: 80 ML (ref 56–104)
ECHO LV EDV INDEX A4C: 34 ML/M2
ECHO LV EDV INDEX BP: 42 ML/M2
ECHO LV EDV NDEX A2C: 50 ML/M2
ECHO LV EJECTION FRACTION A2C: 65 %
ECHO LV EJECTION FRACTION A4C: 60 %
ECHO LV EJECTION FRACTION BIPLANE: 62 % (ref 55–100)
ECHO LV ESV A2C: 34 ML
ECHO LV ESV A4C: 26 ML
ECHO LV ESV BP: 30 ML (ref 19–49)
ECHO LV ESV INDEX A2C: 18 ML/M2
ECHO LV ESV INDEX A4C: 14 ML/M2
ECHO LV ESV INDEX BP: 16 ML/M2
ECHO LV FRACTIONAL SHORTENING: 34 % (ref 28–44)
ECHO LV INTERNAL DIMENSION DIASTOLE INDEX: 2.29 CM/M2
ECHO LV INTERNAL DIMENSION DIASTOLIC: 4.4 CM (ref 3.9–5.3)
ECHO LV INTERNAL DIMENSION SYSTOLIC INDEX: 1.51 CM/M2
ECHO LV INTERNAL DIMENSION SYSTOLIC: 2.9 CM
ECHO LV IVSD: 1 CM (ref 0.6–0.9)
ECHO LV MASS 2D: 191.3 G (ref 67–162)
ECHO LV MASS INDEX 2D: 99.7 G/M2 (ref 43–95)
ECHO LV POSTERIOR WALL DIASTOLIC: 1.4 CM (ref 0.6–0.9)
ECHO LV RELATIVE WALL THICKNESS RATIO: 0.64
ECHO LVOT AREA: 3.5 CM2
ECHO LVOT AV VTI INDEX: 0.81
ECHO LVOT DIAM: 2.1 CM
ECHO LVOT MEAN GRADIENT: 2 MMHG
ECHO LVOT PEAK GRADIENT: 4 MMHG
ECHO LVOT PEAK VELOCITY: 1 M/S
ECHO LVOT STROKE VOLUME INDEX: 35.3 ML/M2
ECHO LVOT SV: 67.9 ML
ECHO LVOT VTI: 19.6 CM
ECHO MV A VELOCITY: 0.81 M/S
ECHO MV E DECELERATION TIME (DT): 279.7 MS
ECHO MV E VELOCITY: 0.79 M/S
ECHO MV E/A RATIO: 0.98
ECHO MV E/E' LATERAL: 5.27
ECHO MV E/E' RATIO (AVERAGED): 5.93
ECHO MV E/E' SEPTAL: 6.58
ECHO PV MAX VELOCITY: 1.1 M/S
ECHO PV PEAK GRADIENT: 5 MMHG
ECHO RIGHT VENTRICULAR SYSTOLIC PRESSURE (RVSP): 30 MMHG
ECHO TV REGURGITANT MAX VELOCITY: 2.58 M/S
ECHO TV REGURGITANT PEAK GRADIENT: 27 MMHG

## (undated) DEVICE — YANKAUER,BULB TIP,W/O VENT,RIGID,STERILE: Brand: MEDLINE

## (undated) DEVICE — SYRINGE IRRIG 60ML SFT PLIABLE BLB EZ TO GRP 1 HND USE W/

## (undated) DEVICE — STERILE POLYISOPRENE POWDER-FREE SURGICAL GLOVES WITH EMOLLIENT COATING: Brand: PROTEXIS

## (undated) DEVICE — SUTURE SZ 0 27IN 5/8 CIR UR-6  TAPER PT VIOLET ABSRB VICRYL J603H

## (undated) DEVICE — GARMENT,MEDLINE,DVT,INT,CALF,MED, GEN2: Brand: MEDLINE

## (undated) DEVICE — TUBING, SUCTION, 1/4" X 10', STRAIGHT: Brand: MEDLINE

## (undated) DEVICE — COVER LT HNDL PLAS RIG 1 PER PK

## (undated) DEVICE — SPONGE GZ W4XL4IN COT RADPQ HIGHLY ABSRB

## (undated) DEVICE — NEEDLE HYPO 25GA L1.5IN BVL ORIENTED ECLIPSE

## (undated) DEVICE — SUTURE MCRYL SZ 4-0 L18IN ABSRB UD L19MM PS-2 3/8 CIR PRIM Y496G

## (undated) DEVICE — TRAY PREP DRY W/ PREM GLV 2 APPL 6 SPNG 2 UNDPD 1 OVERWRAP

## (undated) DEVICE — FILTER SMK EVAC FLO CLR MEGADYNE

## (undated) DEVICE — SURGICAL PROCEDURE PACK GYN LAPAROSCOPY CUST SMH LF

## (undated) DEVICE — SOLUTION IV 1000ML 0.9% SOD CHL

## (undated) DEVICE — SUTURE VCRL SZ 0 L36IN ABSRB VLT L36MM CT-1 1/2 CIR J346H

## (undated) DEVICE — TROCAR: Brand: KII® SLEEVE

## (undated) DEVICE — VISUALIZATION SYSTEM: Brand: CLEARIFY

## (undated) DEVICE — SYR 10ML LUER LOK 1/5ML GRAD --

## (undated) DEVICE — GOWN,SIRUS,FABRNF,XL,20/CS: Brand: MEDLINE

## (undated) DEVICE — SOLUTION IRRIG 3000ML 0.9% SOD CHL FLX CONT 0797208] ICU MEDICAL INC]

## (undated) DEVICE — LAPAROSCOPIC TROCAR SLEEVE/SINGLE USE: Brand: KII® OPTICAL ACCESS SYSTEM

## (undated) DEVICE — Device

## (undated) DEVICE — REM POLYHESIVE ADULT PATIENT RETURN ELECTRODE: Brand: VALLEYLAB

## (undated) DEVICE — (D)PREP SKN CHLRAPRP APPL 26ML -- CONVERT TO ITEM 371833

## (undated) DEVICE — KIT ADAP STERILE WATER 1000ML -- AIRLIFE

## (undated) DEVICE — Z INACTIVE USE 2527070 DRAPE SURG W40XL44IN UNDERBUTTOCK SMS POLYPR W/ PCH BK DISP

## (undated) DEVICE — PAD,SANITARY,11 IN,MAXI,N-STRL,IND WRAP: Brand: MEDLINE

## (undated) DEVICE — GRADUATED BOWL: Brand: DEVON

## (undated) DEVICE — CYSTO/BLADDER IRRIGATION SET, REGULATING CLAMP